# Patient Record
Sex: FEMALE | Race: WHITE | HISPANIC OR LATINO | URBAN - NONMETROPOLITAN AREA
[De-identification: names, ages, dates, MRNs, and addresses within clinical notes are randomized per-mention and may not be internally consistent; named-entity substitution may affect disease eponyms.]

---

## 2020-09-16 ENCOUNTER — OFFICE VISIT (OUTPATIENT)
Dept: URBAN - NONMETROPOLITAN AREA CLINIC 13 | Facility: CLINIC | Age: 35
End: 2020-09-16

## 2020-09-16 NOTE — HPI-TODAY'S VISIT:
35 year old with acute appendicitis s/p laparoscopic appendecetomy, history of sleeve gastrectomy, hepatic steatosis,   no additional records seen in the online medical record.  Plan: - CBC, chemistry, LFTs

## 2021-01-06 ENCOUNTER — OFFICE VISIT (OUTPATIENT)
Dept: URBAN - NONMETROPOLITAN AREA CLINIC 13 | Facility: CLINIC | Age: 36
End: 2021-01-06
Payer: COMMERCIAL

## 2021-01-06 DIAGNOSIS — R19.5 DARK STOOLS: ICD-10-CM

## 2021-01-06 DIAGNOSIS — K92.1 HEMATOCHEZIA: ICD-10-CM

## 2021-01-06 DIAGNOSIS — E61.1 IRON DEFICIENCY: ICD-10-CM

## 2021-01-06 DIAGNOSIS — K59.01 CONSTIPATION: ICD-10-CM

## 2021-01-06 DIAGNOSIS — K64.9 HEMORRHOIDS: ICD-10-CM

## 2021-01-06 PROCEDURE — G8427 DOCREV CUR MEDS BY ELIG CLIN: HCPCS | Performed by: INTERNAL MEDICINE

## 2021-01-06 PROCEDURE — G8482 FLU IMMUNIZE ORDER/ADMIN: HCPCS | Performed by: INTERNAL MEDICINE

## 2021-01-06 PROCEDURE — 99204 OFFICE O/P NEW MOD 45 MIN: CPT | Performed by: INTERNAL MEDICINE

## 2021-01-06 PROCEDURE — 1036F TOBACCO NON-USER: CPT | Performed by: INTERNAL MEDICINE

## 2021-01-06 RX ORDER — ALBUTEROL SULFATE 90 UG/1
AEROSOL, METERED RESPIRATORY (INHALATION)
Qty: 18 G | Refills: 0 | Status: ACTIVE | COMMUNITY

## 2021-01-06 RX ORDER — FLUTICASONE FUROATE AND VILANTEROL TRIFENATATE 100; 25 UG/1; UG/1
POWDER RESPIRATORY (INHALATION)
Qty: 60 BLISTER | Refills: 3 | Status: ACTIVE | COMMUNITY

## 2021-01-06 RX ORDER — CITALOPRAM HYDROBROMIDE 40 MG/1
TABLET ORAL
Qty: 30 DELAYED RELEASE TABLET | Refills: 2 | Status: ACTIVE | COMMUNITY

## 2021-01-06 RX ORDER — LINACLOTIDE 290 UG/1
CAPSULE, GELATIN COATED ORAL
Qty: 30 CAP | Refills: 2 | Status: ACTIVE | COMMUNITY

## 2021-01-06 RX ORDER — SULFAMETHOXAZOLE AND TRIMETHOPRIM 800; 160 MG/1; MG/1
TABLET ORAL
Qty: 20 DELAYED RELEASE TABLET | Refills: 0 | Status: ACTIVE | COMMUNITY

## 2021-01-06 RX ORDER — AZELASTINE HYDROCHLORIDE 137 UG/1
SPRAY, METERED NASAL
Qty: 30 MILLILITER | Refills: 5 | Status: ACTIVE | COMMUNITY

## 2021-01-06 RX ORDER — LANADELUMAB-FLYO 300 MG/2ML
INJECTION, SOLUTION SUBCUTANEOUS
Qty: 4 MILLILITER | Refills: 4 | Status: ACTIVE | COMMUNITY

## 2021-01-06 RX ORDER — LEVOTHYROXINE SODIUM 0.14 MG/1
TABLET ORAL
Qty: 90 DELAYED RELEASE TABLET | Refills: 0 | Status: ACTIVE | COMMUNITY

## 2021-01-06 RX ORDER — TRETINOIN 0.25 MG/G
GEL TOPICAL
Qty: 45 G | Refills: 1 | Status: ACTIVE | COMMUNITY

## 2021-01-06 RX ORDER — SPIRONOLACTONE 25 MG/1
TABLET, FILM COATED ORAL
Qty: 90 DELAYED RELEASE TABLET | Refills: 2 | Status: ACTIVE | COMMUNITY

## 2021-01-06 NOTE — PHYSICAL EXAM GASTROINTESTINAL
Abdomen , Surgical scars on abdomen are well-healed, soft, nontender, nondistended , no guarding or rigidity , no masses palpable , normal bowel sounds , Liver and Spleen , no hepatomegaly present , no hepatosplenomegaly , liver nontender , spleen not palpable , Rectal: Rectal examination performed with chaperone Janet De Paz (medical assistant), perianal examination was notable for external hemorrhoids

## 2021-01-06 NOTE — HPI-TODAY'S VISIT:
35 year old femal with history of obesity s/p gastric sleeve with subsequent weight loss of over 100 pounds, GERD, hiatla hernia, asthma, hereditary angioedema, appendictis s/p appendectomy, constipation (currently on linaclotide) who presents with dark stoola as well as hemorrhoids.   Ms. Echeverria states she has had dark stools on and off for over 6 months. Initially there was thought that the dark stools were secondary to the iron pills she was on given her history of gastric sleeve, however, after discontinuing iron, she has continued to experience dark stools. She is currently on omeprazole daily. She is not on NSAIDs, anti-platelets, or systemic anticoagulation.   Ms. Echeverria has a hsitory of constipation and is currently on linaclotide 290 mcg PO daily. She does have to strain to have a bowel movemnent and recently noticed the development of hemorrhoids. Associated with the straining, she has had episodes of bright red blood per rectum. She has one bowel movement while on linaclotide 290 mcg PO daily. She has never undergone a colonoscopy.  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

## 2021-01-06 NOTE — PHYSICAL EXAM HENT:
Head,  normocephalic,  atraumatic,  Face,  Face within normal limits,  Ears,  External ears within normal limits,  Nose/Nasopharynx,  External nose  normal appearance,  nares patent,  no nasal discharge,  Mouth and Throat,  Oral cavity appearance normal, Lips,  Appearance normal

## 2021-01-10 ENCOUNTER — TELEPHONE ENCOUNTER (OUTPATIENT)
Dept: URBAN - NONMETROPOLITAN AREA CLINIC 2 | Facility: CLINIC | Age: 36
End: 2021-01-10

## 2021-01-10 PROBLEM — 271737000 ANEMIA: Status: ACTIVE | Noted: 2021-01-10

## 2021-01-28 ENCOUNTER — OFFICE VISIT (OUTPATIENT)
Dept: URBAN - NONMETROPOLITAN AREA SURGERY CENTER 1 | Facility: SURGERY CENTER | Age: 36
End: 2021-01-28
Payer: COMMERCIAL

## 2021-01-28 ENCOUNTER — CLAIMS CREATED FROM THE CLAIM WINDOW (OUTPATIENT)
Dept: URBAN - METROPOLITAN AREA CLINIC 4 | Facility: CLINIC | Age: 36
End: 2021-01-28
Payer: COMMERCIAL

## 2021-01-28 DIAGNOSIS — B96.81 BACTERIAL INFECTION DUE TO H. PYLORI: ICD-10-CM

## 2021-01-28 DIAGNOSIS — Z98.84 BARIATRIC SURG STAT-UNSP: ICD-10-CM

## 2021-01-28 DIAGNOSIS — B96.81 HELICOBACTER PYLORI [H. PYLORI] AS THE CAUSE OF DISEASES CLASSIFIED ELSEWHERE: ICD-10-CM

## 2021-01-28 DIAGNOSIS — K29.60 ADENOPAPILLOMATOSIS GASTRICA: ICD-10-CM

## 2021-01-28 DIAGNOSIS — K29.60 OTHER GASTRITIS WITHOUT BLEEDING: ICD-10-CM

## 2021-01-28 DIAGNOSIS — K31.89 OTHER DISEASES OF STOMACH AND DUODENUM: ICD-10-CM

## 2021-01-28 PROCEDURE — 43239 EGD BIOPSY SINGLE/MULTIPLE: CPT | Performed by: INTERNAL MEDICINE

## 2021-01-28 PROCEDURE — G8907 PT DOC NO EVENTS ON DISCHARG: HCPCS | Performed by: INTERNAL MEDICINE

## 2021-01-28 PROCEDURE — 88305 TISSUE EXAM BY PATHOLOGIST: CPT | Performed by: PATHOLOGY

## 2021-01-28 PROCEDURE — 88342 IMHCHEM/IMCYTCHM 1ST ANTB: CPT | Performed by: PATHOLOGY

## 2021-01-28 RX ORDER — FLUTICASONE FUROATE AND VILANTEROL TRIFENATATE 100; 25 UG/1; UG/1
POWDER RESPIRATORY (INHALATION)
Qty: 60 BLISTER | Refills: 3 | Status: ACTIVE | COMMUNITY

## 2021-01-28 RX ORDER — ALBUTEROL SULFATE 90 UG/1
AEROSOL, METERED RESPIRATORY (INHALATION)
Qty: 18 G | Refills: 0 | Status: ACTIVE | COMMUNITY

## 2021-01-28 RX ORDER — SPIRONOLACTONE 25 MG/1
TABLET, FILM COATED ORAL
Qty: 90 DELAYED RELEASE TABLET | Refills: 2 | Status: ACTIVE | COMMUNITY

## 2021-01-28 RX ORDER — LEVOTHYROXINE SODIUM 0.14 MG/1
TABLET ORAL
Qty: 90 DELAYED RELEASE TABLET | Refills: 0 | Status: ACTIVE | COMMUNITY

## 2021-01-28 RX ORDER — AZELASTINE HYDROCHLORIDE 137 UG/1
SPRAY, METERED NASAL
Qty: 30 MILLILITER | Refills: 5 | Status: ACTIVE | COMMUNITY

## 2021-01-28 RX ORDER — LINACLOTIDE 290 UG/1
CAPSULE, GELATIN COATED ORAL
Qty: 30 CAP | Refills: 2 | Status: ACTIVE | COMMUNITY

## 2021-01-28 RX ORDER — LANADELUMAB-FLYO 300 MG/2ML
INJECTION, SOLUTION SUBCUTANEOUS
Qty: 4 MILLILITER | Refills: 4 | Status: ACTIVE | COMMUNITY

## 2021-01-28 RX ORDER — TRETINOIN 0.25 MG/G
GEL TOPICAL
Qty: 45 G | Refills: 1 | Status: ACTIVE | COMMUNITY

## 2021-01-28 RX ORDER — CITALOPRAM HYDROBROMIDE 40 MG/1
TABLET ORAL
Qty: 30 DELAYED RELEASE TABLET | Refills: 2 | Status: ACTIVE | COMMUNITY

## 2021-01-28 RX ORDER — SULFAMETHOXAZOLE AND TRIMETHOPRIM 800; 160 MG/1; MG/1
TABLET ORAL
Qty: 20 DELAYED RELEASE TABLET | Refills: 0 | Status: ACTIVE | COMMUNITY

## 2021-02-10 ENCOUNTER — OFFICE VISIT (OUTPATIENT)
Dept: URBAN - NONMETROPOLITAN AREA CLINIC 13 | Facility: CLINIC | Age: 36
End: 2021-02-10

## 2021-02-10 NOTE — HPI-TODAY'S VISIT:
1/6/2021: Initial Gastroenterology Clinic Appointment  35 year old female with history of obesity s/p gastric sleeve with subsequent weight loss of over 100 pounds, GERD, hiatal hernia, asthma, hereditary angioedema, appendictis s/p appendectomy, constipation (currently on linaclotide) who presents with dark stoola as well as hemorrhoids.   Ms. Echeverria states she has had dark stools on and off for over 6 months. Initially there was thought that the dark stools were secondary to the iron pills she was on given her history of gastric sleeve, however, after discontinuing iron, she has continued to experience dark stools. She is currently on omeprazole daily. She is not on NSAIDs, anti-platelets, or systemic anticoagulation.   Ms. Echeverria has a hsitory of constipation and is currently on linaclotide 290 mcg PO daily. She does have to strain to have a bowel movemnent and recently noticed the development of hemorrhoids. Associated with the straining, she has had episodes of bright red blood per rectum. She has one bowel movement while on linaclotide 290 mcg PO daily. She has never undergone a colonoscopy.  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 Chemistry panel normal. Serum iron 14, ferritin 10.50 TIBC >450.  1/28/2021: EGD The upper third of the esophagus and middle third of the esophagus were normal. LA Grade A esophagitis with no bleeding was found. Evidence of a sleeve gastrectomy was found in the stomach. Biopsied.  The carid and fundus were normal on retroflexion.  The duodenum was normal. Biopsied.  1/28/2021: Pathology from EGD A) Duodenum, Biopsy: NO SIGNIFICANT ABNORMALITY. (B) Stomach, Antrum, Biopsy: CHRONIC HELICOBACTER GASTRITIS; H. PYLORI ORGANISMS IDENTIFIED. NO EVIDENCE OF INTESTINAL METAPLASIA. Negative For Dysplasia or Malignancy.  Plan: - EGD + colonoscopy as [facility] - Treat for Helicobacter pylori.

## 2021-02-24 ENCOUNTER — OFFICE VISIT (OUTPATIENT)
Dept: URBAN - NONMETROPOLITAN AREA CLINIC 13 | Facility: CLINIC | Age: 36
End: 2021-02-24

## 2021-02-24 ENCOUNTER — OFFICE VISIT (OUTPATIENT)
Dept: URBAN - NONMETROPOLITAN AREA CLINIC 13 | Facility: CLINIC | Age: 36
End: 2021-02-24
Payer: COMMERCIAL

## 2021-02-24 VITALS
DIASTOLIC BLOOD PRESSURE: 47 MMHG | SYSTOLIC BLOOD PRESSURE: 96 MMHG | HEART RATE: 56 BPM | WEIGHT: 146 LBS | BODY MASS INDEX: 26.87 KG/M2 | HEIGHT: 62 IN

## 2021-02-24 DIAGNOSIS — J45.909 SEVERE ASTHMA, UNSPECIFIED WHETHER COMPLICATED, UNSPECIFIED WHETHER PERSISTENT: ICD-10-CM

## 2021-02-24 DIAGNOSIS — E61.1 IRON DEFICIENCY: ICD-10-CM

## 2021-02-24 DIAGNOSIS — K59.01 CONSTIPATION: ICD-10-CM

## 2021-02-24 DIAGNOSIS — K64.9 HEMORRHOIDS: ICD-10-CM

## 2021-02-24 DIAGNOSIS — K92.1 HEMATOCHEZIA: ICD-10-CM

## 2021-02-24 DIAGNOSIS — A04.8 HELICOBACTER PYLORI (H. PYLORI) INFECTION: ICD-10-CM

## 2021-02-24 PROBLEM — 35064005 DARK STOOLS: Status: ACTIVE | Noted: 2021-01-06

## 2021-02-24 PROCEDURE — 99214 OFFICE O/P EST MOD 30 MIN: CPT | Performed by: INTERNAL MEDICINE

## 2021-02-24 RX ORDER — LANADELUMAB-FLYO 300 MG/2ML
INJECTION, SOLUTION SUBCUTANEOUS
Qty: 4 MILLILITER | Refills: 4 | Status: ACTIVE | COMMUNITY

## 2021-02-24 RX ORDER — FLUTICASONE FUROATE AND VILANTEROL TRIFENATATE 100; 25 UG/1; UG/1
POWDER RESPIRATORY (INHALATION)
Qty: 60 BLISTER | Refills: 3 | Status: ACTIVE | COMMUNITY

## 2021-02-24 RX ORDER — ALBUTEROL SULFATE 90 UG/1
AEROSOL, METERED RESPIRATORY (INHALATION)
Qty: 18 G | Refills: 0 | Status: ACTIVE | COMMUNITY

## 2021-02-24 RX ORDER — SPIRONOLACTONE 25 MG/1
TABLET, FILM COATED ORAL
Qty: 90 DELAYED RELEASE TABLET | Refills: 2 | Status: ACTIVE | COMMUNITY

## 2021-02-24 RX ORDER — CLARITHROMYCIN 500 MG/1
1 TABLET TABLET, FILM COATED ORAL
Qty: 28 TABLET | Refills: 0 | OUTPATIENT
Start: 2021-02-24 | End: 2021-03-10

## 2021-02-24 RX ORDER — AMOXICILLIN 500 MG/1
2 CAPSULES TABLET, FILM COATED ORAL TWICE A DAY
Qty: 56 CAPSULE | Refills: 0 | OUTPATIENT
Start: 2021-02-24 | End: 2021-03-10

## 2021-02-24 RX ORDER — CITALOPRAM HYDROBROMIDE 40 MG/1
TABLET ORAL
Qty: 30 DELAYED RELEASE TABLET | Refills: 2 | Status: ACTIVE | COMMUNITY

## 2021-02-24 RX ORDER — AZELASTINE HYDROCHLORIDE 137 UG/1
SPRAY, METERED NASAL
Qty: 30 MILLILITER | Refills: 5 | Status: ACTIVE | COMMUNITY

## 2021-02-24 RX ORDER — LINACLOTIDE 290 UG/1
CAPSULE, GELATIN COATED ORAL
Qty: 30 CAP | Refills: 2 | Status: ACTIVE | COMMUNITY

## 2021-02-24 RX ORDER — TRETINOIN 0.25 MG/G
GEL TOPICAL
Qty: 45 G | Refills: 1 | Status: DISCONTINUED | COMMUNITY

## 2021-02-24 RX ORDER — LEVOTHYROXINE SODIUM 0.14 MG/1
TABLET ORAL
Qty: 90 DELAYED RELEASE TABLET | Refills: 0 | Status: ACTIVE | COMMUNITY

## 2021-02-24 RX ORDER — TRETINOIN 0.25 MG/G
GEL TOPICAL
Qty: 45 G | Refills: 1 | Status: ACTIVE | COMMUNITY

## 2021-02-24 RX ORDER — SULFAMETHOXAZOLE AND TRIMETHOPRIM 800; 160 MG/1; MG/1
TABLET ORAL
Qty: 20 DELAYED RELEASE TABLET | Refills: 0 | Status: DISCONTINUED | COMMUNITY

## 2021-02-24 RX ORDER — OMEPRAZOLE 20 MG/1
1 TABLET TABLET, DELAYED RELEASE ORAL EVERY 12 HOURS
Qty: 28 TABLET | Refills: 0 | OUTPATIENT
Start: 2021-02-24

## 2021-02-24 RX ORDER — SULFAMETHOXAZOLE AND TRIMETHOPRIM 800; 160 MG/1; MG/1
TABLET ORAL
Qty: 20 DELAYED RELEASE TABLET | Refills: 0 | Status: ACTIVE | COMMUNITY

## 2021-02-24 RX ORDER — AZELASTINE HYDROCHLORIDE 137 UG/1
SPRAY, METERED NASAL
Qty: 30 MILLILITER | Refills: 5 | Status: DISCONTINUED | COMMUNITY

## 2021-02-24 NOTE — HPI-TODAY'S VISIT:
1/6/2021: Initial Gastroenterology Clinic Appointment  35 year old female with history of obesity s/p gastric sleeve with subsequent weight loss of over 100 pounds, GERD, hiatal hernia, asthma, hereditary angioedema, appendictis s/p appendectomy, constipation (currently on linaclotide) who presents with dark stoola as well as hemorrhoids.   Ms. Echeverria states she has had dark stools on and off for over 6 months. Initially there was thought that the dark stools were secondary to the iron pills she was on given her history of gastric sleeve, however, after discontinuing iron, she has continued to experience dark stools. She is currently on omeprazole daily. She is not on NSAIDs, anti-platelets, or systemic anticoagulation.   Ms. Echeverria has a hsitory of constipation and is currently on linaclotide 290 mcg PO daily. She does have to strain to have a bowel movemnent and recently noticed the development of hemorrhoids. Associated with the straining, she has had episodes of bright red blood per rectum. She has one bowel movement while on linaclotide 290 mcg PO daily. She has never undergone a colonoscopy.  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 Chemistry panel normal. Serum iron 14, ferritin 10.50 TIBC >450.  1/28/2021: EGD The upper third of the esophagus and middle third of the esophagus were normal. LA Grade A esophagitis with no bleeding was found. Evidence of a sleeve gastrectomy was found in the stomach. Biopsied.  The cardia and fundus were normal on retroflexion.   The duodenum was normal. Biopsied.  Due to acute respiratory distress and acute hypoxia, the procedure was aborted.   1/28/2021: Pathology from EGD A) Duodenum, Biopsy: NO SIGNIFICANT ABNORMALITY. (B) Stomach, Antrum, Biopsy: CHRONIC HELICOBACTER GASTRITIS; H. PYLORI ORGANISMS IDENTIFIED. NO EVIDENCE OF INTESTINAL METAPLASIA. Negative For Dysplasia or Malignancy.  2/24/2021: Gastroenterology Follow-Up Ms. Echeverria continues to experience hemorrhoids. She is using hemorrhiod cream, polyethylene glycol, linaclotide. She does acknowledge that prior to the EGD on 1/28/2021, she was not taking her long acting asthma medication and had not been taking the medication for an extended period of time prior to the EGD despite recommendation.

## 2021-02-24 NOTE — HPI-TODAY'S VISIT:
1/6/2021: Initial Gastroenterology Clinic Appointment  35 year old female with history of obesity s/p gastric sleeve with subsequent weight loss of over 100 pounds, GERD, hiatal hernia, asthma, hereditary angioedema, appendictis s/p appendectomy, constipation (currently on linaclotide) who presents with dark stoola as well as hemorrhoids.   Ms. Echeverria states she has had dark stools on and off for over 6 months. Initially there was thought that the dark stools were secondary to the iron pills she was on given her history of gastric sleeve, however, after discontinuing iron, she has continued to experience dark stools. She is currently on omeprazole daily. She is not on NSAIDs, anti-platelets, or systemic anticoagulation.   Ms. Echeverria has a hsitory of constipation and is currently on linaclotide 290 mcg PO daily. She does have to strain to have a bowel movemnent and recently noticed the development of hemorrhoids. Associated with the straining, she has had episodes of bright red blood per rectum. She has one bowel movement while on linaclotide 290 mcg PO daily. She has never undergone a colonoscopy.  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 Chemistry panel normal. Serum iron 14, ferritin 10.50 TIBC >450.  1/28/2021: EGD The upper third of the esophagus and middle third of the esophagus were normal. LA Grade A esophagitis with no bleeding was found. Evidence of a sleeve gastrectomy was found in the stomach. Biopsied.  The carid and fundus were normal on retroflexion.  The duodenum was normal. Biopsied.  1/28/2021: Pathology from EGD A) Duodenum, Biopsy: NO SIGNIFICANT ABNORMALITY. (B) Stomach, Antrum, Biopsy: CHRONIC HELICOBACTER GASTRITIS; H. PYLORI ORGANISMS IDENTIFIED. NO EVIDENCE OF INTESTINAL METAPLASIA. Negative For Dysplasia or Malignancy.  Plan: - EGD + colonoscopy as [facility] - Treat for Helicobacter pylori.

## 2021-04-15 ENCOUNTER — TELEPHONE ENCOUNTER (OUTPATIENT)
Dept: URBAN - NONMETROPOLITAN AREA CLINIC 2 | Facility: CLINIC | Age: 36
End: 2021-04-15

## 2021-05-22 ENCOUNTER — TELEPHONE ENCOUNTER (OUTPATIENT)
Dept: URBAN - NONMETROPOLITAN AREA CLINIC 2 | Facility: CLINIC | Age: 36
End: 2021-05-22

## 2021-05-27 ENCOUNTER — OFFICE VISIT (OUTPATIENT)
Dept: URBAN - NONMETROPOLITAN AREA SURGERY CENTER 1 | Facility: SURGERY CENTER | Age: 36
End: 2021-05-27

## 2021-06-11 ENCOUNTER — OFFICE VISIT (OUTPATIENT)
Dept: URBAN - NONMETROPOLITAN AREA CLINIC 2 | Facility: CLINIC | Age: 36
End: 2021-06-11

## 2021-06-15 ENCOUNTER — OFFICE VISIT (OUTPATIENT)
Dept: URBAN - METROPOLITAN AREA MEDICAL CENTER 1 | Facility: MEDICAL CENTER | Age: 36
End: 2021-06-15
Payer: COMMERCIAL

## 2021-06-15 DIAGNOSIS — D50.9 ANEMIA, IRON DEFICIENCY: ICD-10-CM

## 2021-06-15 PROCEDURE — 45330 DIAGNOSTIC SIGMOIDOSCOPY: CPT | Performed by: INTERNAL MEDICINE

## 2021-06-15 RX ORDER — CITALOPRAM HYDROBROMIDE 40 MG/1
TABLET ORAL
Qty: 30 DELAYED RELEASE TABLET | Refills: 2 | Status: ACTIVE | COMMUNITY

## 2021-06-15 RX ORDER — SPIRONOLACTONE 25 MG/1
TABLET, FILM COATED ORAL
Qty: 90 DELAYED RELEASE TABLET | Refills: 2 | Status: ACTIVE | COMMUNITY

## 2021-06-15 RX ORDER — LEVOTHYROXINE SODIUM 0.14 MG/1
TABLET ORAL
Qty: 90 DELAYED RELEASE TABLET | Refills: 0 | Status: ACTIVE | COMMUNITY

## 2021-06-15 RX ORDER — ALBUTEROL SULFATE 90 UG/1
AEROSOL, METERED RESPIRATORY (INHALATION)
Qty: 18 G | Refills: 0 | Status: ACTIVE | COMMUNITY

## 2021-06-15 RX ORDER — LANADELUMAB-FLYO 300 MG/2ML
INJECTION, SOLUTION SUBCUTANEOUS
Qty: 4 MILLILITER | Refills: 4 | Status: ACTIVE | COMMUNITY

## 2021-06-15 RX ORDER — LINACLOTIDE 290 UG/1
CAPSULE, GELATIN COATED ORAL
Qty: 30 CAP | Refills: 2 | Status: ACTIVE | COMMUNITY

## 2021-06-15 RX ORDER — OMEPRAZOLE 20 MG/1
1 TABLET TABLET, DELAYED RELEASE ORAL EVERY 12 HOURS
Qty: 28 TABLET | Refills: 0 | Status: ACTIVE | COMMUNITY
Start: 2021-02-24

## 2021-06-15 RX ORDER — FLUTICASONE FUROATE AND VILANTEROL TRIFENATATE 100; 25 UG/1; UG/1
POWDER RESPIRATORY (INHALATION)
Qty: 60 BLISTER | Refills: 3 | Status: ACTIVE | COMMUNITY

## 2021-06-30 ENCOUNTER — OFFICE VISIT (OUTPATIENT)
Dept: URBAN - NONMETROPOLITAN AREA CLINIC 13 | Facility: CLINIC | Age: 36
End: 2021-06-30

## 2021-06-30 RX ORDER — FLUTICASONE FUROATE AND VILANTEROL TRIFENATATE 100; 25 UG/1; UG/1
POWDER RESPIRATORY (INHALATION)
Qty: 60 BLISTER | Refills: 3 | Status: ACTIVE | COMMUNITY

## 2021-06-30 RX ORDER — LINACLOTIDE 290 UG/1
CAPSULE, GELATIN COATED ORAL
Qty: 30 CAP | Refills: 2 | Status: ACTIVE | COMMUNITY

## 2021-06-30 RX ORDER — CITALOPRAM HYDROBROMIDE 40 MG/1
TABLET ORAL
Qty: 30 DELAYED RELEASE TABLET | Refills: 2 | Status: ACTIVE | COMMUNITY

## 2021-06-30 RX ORDER — ALBUTEROL SULFATE 90 UG/1
AEROSOL, METERED RESPIRATORY (INHALATION)
Qty: 18 G | Refills: 0 | Status: ACTIVE | COMMUNITY

## 2021-06-30 RX ORDER — LANADELUMAB-FLYO 300 MG/2ML
INJECTION, SOLUTION SUBCUTANEOUS
Qty: 4 MILLILITER | Refills: 4 | Status: ACTIVE | COMMUNITY

## 2021-06-30 RX ORDER — LEVOTHYROXINE SODIUM 0.14 MG/1
TABLET ORAL
Qty: 90 DELAYED RELEASE TABLET | Refills: 0 | Status: ACTIVE | COMMUNITY

## 2021-06-30 RX ORDER — OMEPRAZOLE 20 MG/1
1 TABLET TABLET, DELAYED RELEASE ORAL EVERY 12 HOURS
Qty: 28 TABLET | Refills: 0 | Status: ACTIVE | COMMUNITY
Start: 2021-02-24

## 2021-06-30 RX ORDER — SPIRONOLACTONE 25 MG/1
TABLET, FILM COATED ORAL
Qty: 90 DELAYED RELEASE TABLET | Refills: 2 | Status: ACTIVE | COMMUNITY

## 2021-07-26 ENCOUNTER — TELEPHONE ENCOUNTER (OUTPATIENT)
Dept: URBAN - NONMETROPOLITAN AREA CLINIC 2 | Facility: CLINIC | Age: 36
End: 2021-07-26

## 2021-08-24 ENCOUNTER — WEB ENCOUNTER (OUTPATIENT)
Dept: URBAN - NONMETROPOLITAN AREA CLINIC 2 | Facility: CLINIC | Age: 36
End: 2021-08-24

## 2021-08-24 ENCOUNTER — LAB OUTSIDE AN ENCOUNTER (OUTPATIENT)
Dept: URBAN - NONMETROPOLITAN AREA CLINIC 2 | Facility: CLINIC | Age: 36
End: 2021-08-24

## 2021-08-24 ENCOUNTER — TELEPHONE ENCOUNTER (OUTPATIENT)
Dept: URBAN - NONMETROPOLITAN AREA CLINIC 2 | Facility: CLINIC | Age: 36
End: 2021-08-24

## 2021-08-24 ENCOUNTER — OFFICE VISIT (OUTPATIENT)
Dept: URBAN - NONMETROPOLITAN AREA CLINIC 2 | Facility: CLINIC | Age: 36
End: 2021-08-24
Payer: COMMERCIAL

## 2021-08-24 DIAGNOSIS — E61.1 IRON DEFICIENCY: ICD-10-CM

## 2021-08-24 DIAGNOSIS — K92.1 HEMATOCHEZIA: ICD-10-CM

## 2021-08-24 DIAGNOSIS — A04.8 HELICOBACTER PYLORI (H. PYLORI) INFECTION: ICD-10-CM

## 2021-08-24 DIAGNOSIS — K64.9 HEMORRHOIDS: ICD-10-CM

## 2021-08-24 DIAGNOSIS — K59.01 CONSTIPATION: ICD-10-CM

## 2021-08-24 DIAGNOSIS — J45.909 SEVERE ASTHMA, UNSPECIFIED WHETHER COMPLICATED, UNSPECIFIED WHETHER PERSISTENT: ICD-10-CM

## 2021-08-24 DIAGNOSIS — R93.3 ABNORMAL COLONOSCOPY: ICD-10-CM

## 2021-08-24 PROCEDURE — 99214 OFFICE O/P EST MOD 30 MIN: CPT | Performed by: INTERNAL MEDICINE

## 2021-08-24 RX ORDER — ALBUTEROL SULFATE 90 UG/1
AEROSOL, METERED RESPIRATORY (INHALATION)
Qty: 18 G | Refills: 0 | Status: ACTIVE | COMMUNITY

## 2021-08-24 RX ORDER — CITALOPRAM HYDROBROMIDE 40 MG/1
TABLET ORAL
Qty: 30 DELAYED RELEASE TABLET | Refills: 2 | Status: ACTIVE | COMMUNITY

## 2021-08-24 RX ORDER — LINACLOTIDE 290 UG/1
CAPSULE, GELATIN COATED ORAL
Qty: 30 CAP | Refills: 2 | Status: ACTIVE | COMMUNITY

## 2021-08-24 RX ORDER — FLUTICASONE FUROATE AND VILANTEROL TRIFENATATE 100; 25 UG/1; UG/1
POWDER RESPIRATORY (INHALATION)
Qty: 60 BLISTER | Refills: 3 | Status: ACTIVE | COMMUNITY

## 2021-08-24 RX ORDER — LANADELUMAB-FLYO 300 MG/2ML
INJECTION, SOLUTION SUBCUTANEOUS
Qty: 4 MILLILITER | Refills: 4 | Status: ACTIVE | COMMUNITY

## 2021-08-24 RX ORDER — SPIRONOLACTONE 25 MG/1
TABLET, FILM COATED ORAL
Qty: 90 DELAYED RELEASE TABLET | Refills: 2 | Status: ACTIVE | COMMUNITY

## 2021-08-24 RX ORDER — OMEPRAZOLE 20 MG/1
1 TABLET TABLET, DELAYED RELEASE ORAL EVERY 12 HOURS
Qty: 28 TABLET | Refills: 0 | OUTPATIENT

## 2021-08-24 RX ORDER — OMEPRAZOLE 20 MG/1
1 TABLET TABLET, DELAYED RELEASE ORAL EVERY 12 HOURS
Qty: 28 TABLET | Refills: 0 | Status: ACTIVE | COMMUNITY
Start: 2021-02-24

## 2021-08-24 RX ORDER — LEVOTHYROXINE SODIUM 0.14 MG/1
TABLET ORAL
Qty: 90 DELAYED RELEASE TABLET | Refills: 0 | Status: ACTIVE | COMMUNITY

## 2021-08-24 RX ORDER — BISMUTH SUBCITRATE POTASSIUM, METRONIDAZOLE, TETRACYCLINE HYDROCHLORIDE 140; 125; 125 MG/1; MG/1; MG/1
3 CAPSULES AFTER MEALS AND AT BEDTIME CAPSULE ORAL
Qty: 120 | Refills: 0 | OUTPATIENT
Start: 2021-08-24 | End: 2021-09-03

## 2021-08-24 NOTE — HPI-TODAY'S VISIT:
1/6/2021: Initial Gastroenterology Clinic Appointment       35 year old female with history of obesity s/p gastric sleeve with subsequent weight loss of over 100 pounds, GERD, hiatal hernia, asthma, hereditary angioedema, appendictis s/p appendectomy, constipation (currently on linaclotide) who presents with dark stools as well as hemorrhoids.   Ms. Echeverria states she has had dark stools on and off for over 6 months. Initially there was thought that the dark stools were secondary to the iron pills she was on given her history of gastric sleeve, however, after discontinuing iron, she has continued to experience dark stools. She is currently on omeprazole daily. She is not on NSAIDs, anti-platelets, or systemic anticoagulation.   Ms. Echeverria has a history of constipation and is currently on linaclotide 290 mcg PO daily. She does have to strain to have a bowel movemnent and recently noticed the development of hemorrhoids. Associated with the straining, she has had episodes of bright red blood per rectum. She has one bowel movement while on linaclotide 290 mcg PO daily. She has never undergone a colonoscopy.  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 Chemistry panel normal. Serum iron 14, ferritin 10.50 TIBC >450.  1/28/2021: EGD The upper third of the esophagus and middle third of the esophagus were normal. LA Grade A esophagitis with no bleeding was found. Evidence of a sleeve gastrectomy was found in the stomach. Biopsied.  The cardia and fundus were normal on retroflexion.   The duodenum was normal. Biopsied.  Due to acute respiratory distress and acute hypoxia, the procedure was aborted.   1/28/2021: Pathology from EGD A) Duodenum, Biopsy: NO SIGNIFICANT ABNORMALITY. (B) Stomach, Antrum, Biopsy: CHRONIC HELICOBACTER GASTRITIS; H. PYLORI ORGANISMS IDENTIFIED. NO EVIDENCE OF INTESTINAL METAPLASIA. Negative For Dysplasia or Malignancy.  2/24/2021: Gastroenterology Follow-Up Ms. Echeverria continues to experience hemorrhoids. She is using hemorrhiod cream, polyethylene glycol, linaclotide. She does acknowledge that prior to the EGD on 1/28/2021, she was not taking her long acting asthma medication and had not been taking the medication for an extended period of time prior to the EGD despite recommendation.  6/15/2021: EGD Findings: - The upper third of the esophagus and middle third of the esophagus were normal. - A small hiatal hernia was present. - A single 3 mm papule (nodule) with no stigmata of recent bleeding was found on the gastric aspect of the gastroesophageal junction. Biopsies were taken with a cold forceps for histology. - Evidence of a sleeve gastrectomy was found in the entire examined stomach. This was characterized by healthy appearing mucosa. Biopsies were taken with a cold forceps for Helicobacter pylori testing. Estimated blood loss was minimal. - The cardia and gastric fundus were normal on retroflexion. - The examined duodenum was normal.  6/15/2021: Pathology from EGD Final Diagnosis A.  GASTRIC ANTRUM (BIOPSY):   MODERATELY SEVERE CHRONIC ACTIVE GASTRITIS (NEGATIVE FOR ATYPIA AND ULCERATION); IMMUNOSTAIN POSITIVE FOR HELICOBACTER PYLORI MICROORGANISMS. B.  GASTRIC MUCOSA (BIOPSY):   SEVERE CHRONIC ACTIVE GASTRITIS AND SOLITARY FRAGMENT OF SQUAMOUS MUCOSA WITH FOCAL ACUTE INFLAMMATION (NEGATIVE FOR ATYPIA); IMMUNOSTAIN POSITIVE FOR HELICOBACTER PYLORI MICROORGANISMS.  6/15/2021: Colonoscopy Findings:  - Hemorrhoids were found on perianal exam. - Copious quantities of stool was found in the rectum and in the sigmoid colon, precluding visualization. Lavage of the area was performed using a large amount of normal saline, resulting in incomplete clearance with continued poor visualization. Given the degree of stool in the colon, the colonoscopy as aborted. - Hemorrhoids were found during retroflexion.  8/24/2021: Gastroenterology Follow-Up Visit Ms. Echeverria notes that she did take clarithromycin + amoxicillin + PPI as instructed for treatment of Helicobacter pylori. She did not miss any doses of the medication. She continues to experience significant constipation which does not improve with linzess, miralax, fiber. She does note that she has been experiencing constipation for over a decade. Denies weight loss, melena, hematochezia.

## 2021-08-28 PROBLEM — 35240004 IRON DEFICIENCY: Status: ACTIVE | Noted: 2021-01-09

## 2021-08-28 PROBLEM — 70153002 HEMORRHOIDS: Status: ACTIVE | Noted: 2021-01-09

## 2021-08-28 PROBLEM — 721730009: Status: ACTIVE | Noted: 2021-02-24

## 2021-08-28 PROBLEM — 405729008 HEMATOCHEZIA: Status: ACTIVE | Noted: 2021-01-10

## 2021-08-28 PROBLEM — 313172000: Status: ACTIVE | Noted: 2021-08-24

## 2021-09-15 ENCOUNTER — OFFICE VISIT (OUTPATIENT)
Dept: URBAN - NONMETROPOLITAN AREA CLINIC 13 | Facility: CLINIC | Age: 36
End: 2021-09-15

## 2021-09-15 RX ORDER — SPIRONOLACTONE 25 MG/1
TABLET, FILM COATED ORAL
Qty: 90 DELAYED RELEASE TABLET | Refills: 2 | Status: ACTIVE | COMMUNITY

## 2021-09-15 RX ORDER — ALBUTEROL SULFATE 90 UG/1
AEROSOL, METERED RESPIRATORY (INHALATION)
Qty: 18 G | Refills: 0 | Status: ACTIVE | COMMUNITY

## 2021-09-15 RX ORDER — LANADELUMAB-FLYO 300 MG/2ML
INJECTION, SOLUTION SUBCUTANEOUS
Qty: 4 MILLILITER | Refills: 4 | Status: ACTIVE | COMMUNITY

## 2021-09-15 RX ORDER — LINACLOTIDE 290 UG/1
CAPSULE, GELATIN COATED ORAL
Qty: 30 CAP | Refills: 2 | Status: ACTIVE | COMMUNITY

## 2021-09-15 RX ORDER — FLUTICASONE FUROATE AND VILANTEROL TRIFENATATE 100; 25 UG/1; UG/1
POWDER RESPIRATORY (INHALATION)
Qty: 60 BLISTER | Refills: 3 | Status: ACTIVE | COMMUNITY

## 2021-09-15 RX ORDER — CITALOPRAM HYDROBROMIDE 40 MG/1
TABLET ORAL
Qty: 30 DELAYED RELEASE TABLET | Refills: 2 | Status: ACTIVE | COMMUNITY

## 2021-09-15 RX ORDER — OMEPRAZOLE 20 MG/1
1 TABLET TABLET, DELAYED RELEASE ORAL EVERY 12 HOURS
Qty: 28 TABLET | Refills: 0 | Status: ACTIVE | COMMUNITY

## 2021-09-15 RX ORDER — LEVOTHYROXINE SODIUM 0.14 MG/1
TABLET ORAL
Qty: 90 DELAYED RELEASE TABLET | Refills: 0 | Status: ACTIVE | COMMUNITY

## 2021-09-21 ENCOUNTER — OFFICE VISIT (OUTPATIENT)
Dept: URBAN - METROPOLITAN AREA MEDICAL CENTER 28 | Facility: MEDICAL CENTER | Age: 36
End: 2021-09-21
Payer: COMMERCIAL

## 2021-09-21 DIAGNOSIS — K59.09 CHANGE IN BOWEL MOVEMENTS INTERMITTENT CONSTIPATION. URGENCY IN THE MORNING.: ICD-10-CM

## 2021-09-21 PROCEDURE — 91120 RECTAL SENSATION TEST: CPT | Performed by: INTERNAL MEDICINE

## 2021-09-21 PROCEDURE — 91122 ANAL PRESSURE RECORD: CPT | Performed by: INTERNAL MEDICINE

## 2021-09-29 ENCOUNTER — TELEPHONE ENCOUNTER (OUTPATIENT)
Dept: URBAN - NONMETROPOLITAN AREA CLINIC 2 | Facility: CLINIC | Age: 36
End: 2021-09-29

## 2021-09-29 PROBLEM — 179950008: Status: ACTIVE | Noted: 2021-09-29

## 2021-10-18 ENCOUNTER — OFFICE VISIT (OUTPATIENT)
Dept: URBAN - NONMETROPOLITAN AREA CLINIC 2 | Facility: CLINIC | Age: 36
End: 2021-10-18

## 2021-10-18 ENCOUNTER — TELEPHONE ENCOUNTER (OUTPATIENT)
Dept: URBAN - NONMETROPOLITAN AREA CLINIC 2 | Facility: CLINIC | Age: 36
End: 2021-10-18

## 2021-10-18 RX ORDER — FLUTICASONE FUROATE AND VILANTEROL TRIFENATATE 100; 25 UG/1; UG/1
POWDER RESPIRATORY (INHALATION)
Qty: 60 BLISTER | Refills: 3 | Status: ACTIVE | COMMUNITY

## 2021-10-18 RX ORDER — LINACLOTIDE 290 UG/1
CAPSULE, GELATIN COATED ORAL
Qty: 30 CAP | Refills: 2 | Status: ACTIVE | COMMUNITY

## 2021-10-18 RX ORDER — LANADELUMAB-FLYO 300 MG/2ML
INJECTION, SOLUTION SUBCUTANEOUS
Qty: 4 MILLILITER | Refills: 4 | Status: ACTIVE | COMMUNITY

## 2021-10-18 RX ORDER — ALBUTEROL SULFATE 90 UG/1
AEROSOL, METERED RESPIRATORY (INHALATION)
Qty: 18 G | Refills: 0 | Status: ACTIVE | COMMUNITY

## 2021-10-18 RX ORDER — CITALOPRAM HYDROBROMIDE 40 MG/1
TABLET ORAL
Qty: 30 DELAYED RELEASE TABLET | Refills: 2 | Status: ACTIVE | COMMUNITY

## 2021-10-18 RX ORDER — SPIRONOLACTONE 25 MG/1
TABLET, FILM COATED ORAL
Qty: 90 DELAYED RELEASE TABLET | Refills: 2 | Status: ACTIVE | COMMUNITY

## 2021-10-18 RX ORDER — LEVOTHYROXINE SODIUM 0.14 MG/1
TABLET ORAL
Qty: 90 DELAYED RELEASE TABLET | Refills: 0 | Status: ACTIVE | COMMUNITY

## 2021-10-18 RX ORDER — OMEPRAZOLE 20 MG/1
1 TABLET TABLET, DELAYED RELEASE ORAL EVERY 12 HOURS
Qty: 28 TABLET | Refills: 0 | OUTPATIENT

## 2021-10-18 RX ORDER — OMEPRAZOLE 20 MG/1
1 TABLET TABLET, DELAYED RELEASE ORAL EVERY 12 HOURS
Qty: 28 TABLET | Refills: 0 | Status: ACTIVE | COMMUNITY

## 2021-10-18 NOTE — HPI-TODAY'S VISIT:
1/6/2021: Initial Gastroenterology Clinic Appointment         35 year old female with history of obesity s/p gastric sleeve with subsequent weight loss of over 100 pounds, GERD, hiatal hernia, asthma, hereditary angioedema, appendictis s/p appendectomy, constipation (currently on linaclotide) who presents with dark stools as well as hemorrhoids.   Ms. Echeverria states she has had dark stools on and off for over 6 months. Initially there was thought that the dark stools were secondary to the iron pills she was on given her history of gastric sleeve, however, after discontinuing iron, she has continued to experience dark stools. She is currently on omeprazole daily. She is not on NSAIDs, anti-platelets, or systemic anticoagulation.   Ms. Echeverria has a history of constipation and is currently on linaclotide 290 mcg PO daily. She does have to strain to have a bowel movemnent and recently noticed the development of hemorrhoids. Associated with the straining, she has had episodes of bright red blood per rectum. She has one bowel movement while on linaclotide 290 mcg PO daily. She has never undergone a colonoscopy.  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 Chemistry panel normal. Serum iron 14, ferritin 10.50 TIBC >450.  1/28/2021: EGD The upper third of the esophagus and middle third of the esophagus were normal. LA Grade A esophagitis with no bleeding was found. Evidence of a sleeve gastrectomy was found in the stomach. Biopsied.  The cardia and fundus were normal on retroflexion.   The duodenum was normal. Biopsied.  Due to acute respiratory distress and acute hypoxia, the procedure was aborted.   1/28/2021: Pathology from EGD A) Duodenum, Biopsy: NO SIGNIFICANT ABNORMALITY. (B) Stomach, Antrum, Biopsy: CHRONIC HELICOBACTER GASTRITIS; H. PYLORI ORGANISMS IDENTIFIED. NO EVIDENCE OF INTESTINAL METAPLASIA. Negative For Dysplasia or Malignancy.  2/24/2021: Gastroenterology Follow-Up Ms. Echeverria continues to experience hemorrhoids. She is using hemorrhiod cream, polyethylene glycol, linaclotide. She does acknowledge that prior to the EGD on 1/28/2021, she was not taking her long acting asthma medication and had not been taking the medication for an extended period of time prior to the EGD despite recommendation.  6/15/2021: EGD Findings: - The upper third of the esophagus and middle third of the esophagus were normal. - A small hiatal hernia was present. - A single 3 mm papule (nodule) with no stigmata of recent bleeding was found on the gastric aspect of the gastroesophageal junction. Biopsies were taken with a cold forceps for histology. - Evidence of a sleeve gastrectomy was found in the entire examined stomach. This was characterized by healthy appearing mucosa. Biopsies were taken with a cold forceps for Helicobacter pylori testing. Estimated blood loss was minimal. - The cardia and gastric fundus were normal on retroflexion. - The examined duodenum was normal.  6/15/2021: Pathology from EGD Final Diagnosis A.  GASTRIC ANTRUM (BIOPSY):   MODERATELY SEVERE CHRONIC ACTIVE GASTRITIS (NEGATIVE FOR ATYPIA AND ULCERATION); IMMUNOSTAIN POSITIVE FOR HELICOBACTER PYLORI MICROORGANISMS. B.  GASTRIC MUCOSA (BIOPSY):   SEVERE CHRONIC ACTIVE GASTRITIS AND SOLITARY FRAGMENT OF SQUAMOUS MUCOSA WITH FOCAL ACUTE INFLAMMATION (NEGATIVE FOR ATYPIA); IMMUNOSTAIN POSITIVE FOR HELICOBACTER PYLORI MICROORGANISMS.  6/15/2021: Colonoscopy Findings:  - Hemorrhoids were found on perianal exam. - Copious quantities of stool was found in the rectum and in the sigmoid colon, precluding visualization. Lavage of the area was performed using a large amount of normal saline, resulting in incomplete clearance with continued poor visualization. Given the degree of stool in the colon, the colonoscopy as aborted. - Hemorrhoids were found during retroflexion.  8/24/2021: Gastroenterology Follow-Up Visit Ms. Echeverria notes that she did take clarithromycin + amoxicillin + PPI as instructed for treatment of Helicobacter pylori. She did not miss any doses of the medication. She continues to experience significant constipation which does not improve with linzess, miralax, fiber. She does note that she has been experiencing constipation for over a decade. Denies weight loss, melena, hematochezia. 1/6/2021: Initial Gastroenterology Clinic Appointment       35 year old female with history of obesity s/p gastric sleeve with subsequent weight loss of over 100 pounds, GERD, hiatal hernia, asthma, hereditary angioedema, appendictis s/p appendectomy, constipation (currently on linaclotide) who presents with dark stools as well as hemorrhoids.   Ms. Echeverria states she has had dark stools on and off for over 6 months. Initially there was thought that the dark stools were secondary to the iron pills she was on given her history of gastric sleeve, however, after discontinuing iron, she has continued to experience dark stools. She is currently on omeprazole daily. She is not on NSAIDs, anti-platelets, or systemic anticoagulation.   Ms. Echeverria has a history of constipation and is currently on linaclotide 290 mcg PO daily. She does have to strain to have a bowel movemnent and recently noticed the development of hemorrhoids. Associated with the straining, she has had episodes of bright red blood per rectum. She has one bowel movement while on linaclotide 290 mcg PO daily. She has never undergone a colonoscopy.  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 Chemistry panel normal. Serum iron 14, ferritin 10.50 TIBC >450.  1/28/2021: EGD The upper third of the esophagus and middle third of the esophagus were normal. LA Grade A esophagitis with no bleeding was found. Evidence of a sleeve gastrectomy was found in the stomach. Biopsied.  The cardia and fundus were normal on retroflexion.   The duodenum was normal. Biopsied.  Due to acute respiratory distress and acute hypoxia, the procedure was aborted.   1/28/2021: Pathology from EGD A) Duodenum, Biopsy: NO SIGNIFICANT ABNORMALITY. (B) Stomach, Antrum, Biopsy: CHRONIC HELICOBACTER GASTRITIS; H. PYLORI ORGANISMS IDENTIFIED. NO EVIDENCE OF INTESTINAL METAPLASIA. Negative For Dysplasia or Malignancy.  2/24/2021: Gastroenterology Follow-Up Ms. Echeverria continues to experience hemorrhoids. She is using hemorrhiod cream, polyethylene glycol, linaclotide. She does acknowledge that prior to the EGD on 1/28/2021, she was not taking her long acting asthma medication and had not been taking the medication for an extended period of time prior to the EGD despite recommendation.  6/15/2021: EGD Findings: - The upper third of the esophagus and middle third of the esophagus were normal. - A small hiatal hernia was present. - A single 3 mm papule (nodule) with no stigmata of recent bleeding was found on the gastric aspect of the gastroesophageal junction. Biopsies were taken with a cold forceps for histology. - Evidence of a sleeve gastrectomy was found in the entire examined stomach. This was characterized by healthy appearing mucosa. Biopsies were taken with a cold forceps for Helicobacter pylori testing. Estimated blood loss was minimal. - The cardia and gastric fundus were normal on retroflexion. - The examined duodenum was normal.  6/15/2021: Pathology from EGD Final Diagnosis A.  GASTRIC ANTRUM (BIOPSY):   MODERATELY SEVERE CHRONIC ACTIVE GASTRITIS (NEGATIVE FOR ATYPIA AND ULCERATION); IMMUNOSTAIN POSITIVE FOR HELICOBACTER PYLORI MICROORGANISMS. B.  GASTRIC MUCOSA (BIOPSY):   SEVERE CHRONIC ACTIVE GASTRITIS AND SOLITARY FRAGMENT OF SQUAMOUS MUCOSA WITH FOCAL ACUTE INFLAMMATION (NEGATIVE FOR ATYPIA); IMMUNOSTAIN POSITIVE FOR HELICOBACTER PYLORI MICROORGANISMS.  6/15/2021: Colonoscopy Findings:  - Hemorrhoids were found on perianal exam. - Copious quantities of stool was found in the rectum and in the sigmoid colon, precluding visualization. Lavage of the area was performed using a large amount of normal saline, resulting in incomplete clearance with continued poor visualization. Given the degree of stool in the colon, the colonoscopy as aborted. - Hemorrhoids were found during retroflexion.  8/24/2021: Gastroenterology Follow-Up Visit Ms. Echeverria notes that she did take clarithromycin + amoxicillin + PPI as instructed for treatment of Helicobacter pylori. She did not miss any doses of the medication. She continues to experience significant constipation which does not improve with linzess, miralax, fiber. She does note that she has been experiencing constipation for over a decade. Denies weight loss, melena, hematochezia.  8/30/2021: CT Abdomen and Pelvis with Contrast  IMPRESSION: 1.  Nonspecific fluid in the colon can be related to diarrhea/mild colitis. 2.  Leiomyomatous uterus. 3.  Incomplete imaging of the hepatic dome. 9/21/2021: Anorectal Manometry Overall abnormal anorectal manometry study for high 1st rectal sensation, paradoxical pushes, and failure of balloon defecation. This can be seen with pelvic floor dysfunction. Weak internal and external sphincter tones.  Plan: -Retest for Helicobacter pylori. -Dicsuss colonoscopy.  -Physical Therapy. -Discuss labs.

## 2021-12-06 PROBLEM — 14760008 CONSTIPATION: Status: ACTIVE | Noted: 2021-01-09

## 2021-12-06 PROBLEM — 370221004: Status: ACTIVE | Noted: 2021-08-24

## 2021-12-07 ENCOUNTER — OFFICE VISIT (OUTPATIENT)
Dept: URBAN - NONMETROPOLITAN AREA CLINIC 2 | Facility: CLINIC | Age: 36
End: 2021-12-07

## 2021-12-07 RX ORDER — LANADELUMAB-FLYO 300 MG/2ML
INJECTION, SOLUTION SUBCUTANEOUS
Qty: 4 MILLILITER | Refills: 4 | Status: ACTIVE | COMMUNITY

## 2021-12-07 RX ORDER — CITALOPRAM HYDROBROMIDE 40 MG/1
TABLET ORAL
Qty: 30 DELAYED RELEASE TABLET | Refills: 2 | Status: ACTIVE | COMMUNITY

## 2021-12-07 RX ORDER — FLUTICASONE FUROATE AND VILANTEROL TRIFENATATE 100; 25 UG/1; UG/1
POWDER RESPIRATORY (INHALATION)
Qty: 60 BLISTER | Refills: 3 | Status: ACTIVE | COMMUNITY

## 2021-12-07 RX ORDER — OMEPRAZOLE 20 MG/1
1 TABLET TABLET, DELAYED RELEASE ORAL EVERY 12 HOURS
Qty: 28 TABLET | Refills: 0 | Status: ACTIVE | COMMUNITY

## 2021-12-07 RX ORDER — ALBUTEROL SULFATE 90 UG/1
AEROSOL, METERED RESPIRATORY (INHALATION)
Qty: 18 G | Refills: 0 | Status: ACTIVE | COMMUNITY

## 2021-12-07 RX ORDER — LINACLOTIDE 290 UG/1
CAPSULE, GELATIN COATED ORAL
Qty: 30 CAP | Refills: 2 | Status: ACTIVE | COMMUNITY

## 2021-12-07 RX ORDER — OMEPRAZOLE 20 MG/1
1 TABLET TABLET, DELAYED RELEASE ORAL EVERY 12 HOURS
Qty: 28 TABLET | Refills: 0 | OUTPATIENT

## 2021-12-07 RX ORDER — LEVOTHYROXINE SODIUM 0.14 MG/1
TABLET ORAL
Qty: 90 DELAYED RELEASE TABLET | Refills: 0 | Status: ACTIVE | COMMUNITY

## 2021-12-07 RX ORDER — SPIRONOLACTONE 25 MG/1
TABLET, FILM COATED ORAL
Qty: 90 DELAYED RELEASE TABLET | Refills: 2 | Status: ACTIVE | COMMUNITY

## 2021-12-07 NOTE — HPI-TODAY'S VISIT:
1/6/2021: Initial Gastroenterology Clinic Appointment         35 year old female with history of obesity s/p gastric sleeve with subsequent weight loss of over 100 pounds, GERD, hiatal hernia, asthma, hereditary angioedema, appendictis s/p appendectomy, constipation (currently on linaclotide) who presents with dark stools as well as hemorrhoids.   Ms. Echeverria states she has had dark stools on and off for over 6 months. Initially there was thought that the dark stools were secondary to the iron pills she was on given her history of gastric sleeve, however, after discontinuing iron, she has continued to experience dark stools. She is currently on omeprazole daily. She is not on NSAIDs, anti-platelets, or systemic anticoagulation.   Ms. Echeverria has a history of constipation and is currently on linaclotide 290 mcg PO daily. She does have to strain to have a bowel movemnent and recently noticed the development of hemorrhoids. Associated with the straining, she has had episodes of bright red blood per rectum. She has one bowel movement while on linaclotide 290 mcg PO daily. She has never undergone a colonoscopy.  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 Chemistry panel normal. Serum iron 14, ferritin 10.50 TIBC >450.  1/28/2021: EGD The upper third of the esophagus and middle third of the esophagus were normal. LA Grade A esophagitis with no bleeding was found. Evidence of a sleeve gastrectomy was found in the stomach. Biopsied.  The cardia and fundus were normal on retroflexion.   The duodenum was normal. Biopsied.  Due to acute respiratory distress and acute hypoxia, the procedure was aborted.   1/28/2021: Pathology from EGD A) Duodenum, Biopsy: NO SIGNIFICANT ABNORMALITY. (B) Stomach, Antrum, Biopsy: CHRONIC HELICOBACTER GASTRITIS; H. PYLORI ORGANISMS IDENTIFIED. NO EVIDENCE OF INTESTINAL METAPLASIA. Negative For Dysplasia or Malignancy.  2/24/2021: Gastroenterology Follow-Up Ms. Echeverria continues to experience hemorrhoids. She is using hemorrhiod cream, polyethylene glycol, linaclotide. She does acknowledge that prior to the EGD on 1/28/2021, she was not taking her long acting asthma medication and had not been taking the medication for an extended period of time prior to the EGD despite recommendation.  6/15/2021: EGD Findings: - The upper third of the esophagus and middle third of the esophagus were normal. - A small hiatal hernia was present. - A single 3 mm papule (nodule) with no stigmata of recent bleeding was found on the gastric aspect of the gastroesophageal junction. Biopsies were taken with a cold forceps for histology. - Evidence of a sleeve gastrectomy was found in the entire examined stomach. This was characterized by healthy appearing mucosa. Biopsies were taken with a cold forceps for Helicobacter pylori testing. Estimated blood loss was minimal. - The cardia and gastric fundus were normal on retroflexion. - The examined duodenum was normal.  6/15/2021: Pathology from EGD Final Diagnosis A.  GASTRIC ANTRUM (BIOPSY):   MODERATELY SEVERE CHRONIC ACTIVE GASTRITIS (NEGATIVE FOR ATYPIA AND ULCERATION); IMMUNOSTAIN POSITIVE FOR HELICOBACTER PYLORI MICROORGANISMS. B.  GASTRIC MUCOSA (BIOPSY):   SEVERE CHRONIC ACTIVE GASTRITIS AND SOLITARY FRAGMENT OF SQUAMOUS MUCOSA WITH FOCAL ACUTE INFLAMMATION (NEGATIVE FOR ATYPIA); IMMUNOSTAIN POSITIVE FOR HELICOBACTER PYLORI MICROORGANISMS.  6/15/2021: Colonoscopy Findings:  - Hemorrhoids were found on perianal exam. - Copious quantities of stool was found in the rectum and in the sigmoid colon, precluding visualization. Lavage of the area was performed using a large amount of normal saline, resulting in incomplete clearance with continued poor visualization. Given the degree of stool in the colon, the colonoscopy as aborted. - Hemorrhoids were found during retroflexion.  8/24/2021: Gastroenterology Follow-Up Visit Ms. Echeverria notes that she did take clarithromycin + amoxicillin + PPI as instructed for treatment of Helicobacter pylori. She did not miss any doses of the medication. She continues to experience significant constipation which does not improve with linzess, miralax, fiber. She does note that she has been experiencing constipation for over a decade. Denies weight loss, melena, hematochezia. 1/6/2021: Initial Gastroenterology Clinic Appointment       35 year old female with history of obesity s/p gastric sleeve with subsequent weight loss of over 100 pounds, GERD, hiatal hernia, asthma, hereditary angioedema, appendictis s/p appendectomy, constipation (currently on linaclotide) who presents with dark stools as well as hemorrhoids.   Ms. Echeverria states she has had dark stools on and off for over 6 months. Initially there was thought that the dark stools were secondary to the iron pills she was on given her history of gastric sleeve, however, after discontinuing iron, she has continued to experience dark stools. She is currently on omeprazole daily. She is not on NSAIDs, anti-platelets, or systemic anticoagulation.   Ms. Echeverria has a history of constipation and is currently on linaclotide 290 mcg PO daily. She does have to strain to have a bowel movemnent and recently noticed the development of hemorrhoids. Associated with the straining, she has had episodes of bright red blood per rectum. She has one bowel movement while on linaclotide 290 mcg PO daily. She has never undergone a colonoscopy.  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 Chemistry panel normal. Serum iron 14, ferritin 10.50 TIBC >450.  1/28/2021: EGD The upper third of the esophagus and middle third of the esophagus were normal. LA Grade A esophagitis with no bleeding was found. Evidence of a sleeve gastrectomy was found in the stomach. Biopsied.  The cardia and fundus were normal on retroflexion.   The duodenum was normal. Biopsied.  Due to acute respiratory distress and acute hypoxia, the procedure was aborted.   1/28/2021: Pathology from EGD A) Duodenum, Biopsy: NO SIGNIFICANT ABNORMALITY. (B) Stomach, Antrum, Biopsy: CHRONIC HELICOBACTER GASTRITIS; H. PYLORI ORGANISMS IDENTIFIED. NO EVIDENCE OF INTESTINAL METAPLASIA. Negative For Dysplasia or Malignancy.  2/24/2021: Gastroenterology Follow-Up Ms. Echeverria continues to experience hemorrhoids. She is using hemorrhiod cream, polyethylene glycol, linaclotide. She does acknowledge that prior to the EGD on 1/28/2021, she was not taking her long acting asthma medication and had not been taking the medication for an extended period of time prior to the EGD despite recommendation.  6/15/2021: EGD Findings: - The upper third of the esophagus and middle third of the esophagus were normal. - A small hiatal hernia was present. - A single 3 mm papule (nodule) with no stigmata of recent bleeding was found on the gastric aspect of the gastroesophageal junction. Biopsies were taken with a cold forceps for histology. - Evidence of a sleeve gastrectomy was found in the entire examined stomach. This was characterized by healthy appearing mucosa. Biopsies were taken with a cold forceps for Helicobacter pylori testing. Estimated blood loss was minimal. - The cardia and gastric fundus were normal on retroflexion. - The examined duodenum was normal.  6/15/2021: Pathology from EGD Final Diagnosis A.  GASTRIC ANTRUM (BIOPSY):   MODERATELY SEVERE CHRONIC ACTIVE GASTRITIS (NEGATIVE FOR ATYPIA AND ULCERATION); IMMUNOSTAIN POSITIVE FOR HELICOBACTER PYLORI MICROORGANISMS. B.  GASTRIC MUCOSA (BIOPSY):   SEVERE CHRONIC ACTIVE GASTRITIS AND SOLITARY FRAGMENT OF SQUAMOUS MUCOSA WITH FOCAL ACUTE INFLAMMATION (NEGATIVE FOR ATYPIA); IMMUNOSTAIN POSITIVE FOR HELICOBACTER PYLORI MICROORGANISMS.  6/15/2021: Colonoscopy Findings:  - Hemorrhoids were found on perianal exam. - Copious quantities of stool was found in the rectum and in the sigmoid colon, precluding visualization. Lavage of the area was performed using a large amount of normal saline, resulting in incomplete clearance with continued poor visualization. Given the degree of stool in the colon, the colonoscopy as aborted. - Hemorrhoids were found during retroflexion.  8/24/2021: Gastroenterology Follow-Up Visit Ms. Echeverria notes that she did take clarithromycin + amoxicillin + PPI as instructed for treatment of Helicobacter pylori. She did not miss any doses of the medication. She continues to experience significant constipation which does not improve with linzess, miralax, fiber. She does note that she has been experiencing constipation for over a decade. Denies weight loss, melena, hematochezia.  8/30/2021: CT Abdomen and Pelvis with Contrast  IMPRESSION: 1.  Nonspecific fluid in the colon can be related to diarrhea/mild colitis. 2.  Leiomyomatous uterus. 3.  Incomplete imaging of the hepatic dome. 9/21/2021: Anorectal Manometry Overall abnormal anorectal manometry study for high 1st rectal sensation, paradoxical pushes, and failure of balloon defecation. This can be seen with pelvic floor dysfunction. Weak internal and external sphincter tones.  Plan: -Retest for Helicobacter pylori. -Dicsuss colonoscopy.  -Physical Therapy. -Discuss labs. - Planning on undergoing minverva ablatoin for endometrial bleeding.